# Patient Record
Sex: MALE | Race: WHITE | NOT HISPANIC OR LATINO | URBAN - NONMETROPOLITAN AREA
[De-identification: names, ages, dates, MRNs, and addresses within clinical notes are randomized per-mention and may not be internally consistent; named-entity substitution may affect disease eponyms.]

---

## 2017-08-09 NOTE — PATIENT DISCUSSION
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 - Plan : Monitor for changes. Advised patient to call our office with decreased vision or increased distortion. Patient advised to check [employer] regularly (once weekly or more) and use nutraceuticals such as AREDS 2 eye vitamins. Wear sunglasses when outdoors and eat green, leafy vegetables to maintain ocular health. RV 4-6 Months for Follow Up and Macular OCT. Sooner with problems or changes in vision.

## 2017-08-09 NOTE — PATIENT DISCUSSION
(C21.2587) Nonexudative age-related macular degeneration left eye inter - Assesment : Examination revealed AMD Dry OS. - Plan : Monitor for changes. Advised patient to call our office with decreased vision or increased distortion. Patient advised to check [employer] regularly (once weekly or more) and use nutraceuticals such as AREDS 2 eye vitamins. Wear sunglasses when outdoors and eat green, leafy vegetables to maintain ocular health. Consider referring to Dr. Domenica Marie for another eval if worsening at next appt. RV 4-6 Months for Follow Up and Macular OCT.

## 2017-08-09 NOTE — PATIENT DISCUSSION
(H35.373) Puckering of macula, bilateral - Assesment : Examination revealed ERM. Stable - Plan : Monitor for changes. Advised patient to call our office with decreased vision or increased symptoms.

## 2017-08-09 NOTE — PATIENT DISCUSSION
(H25.013) Cortical age-related cataract, bilateral - Assesment : Examination revealed Cortical Senile Cataract. - Plan : Monitor for changes. Advised patient to call our office with decreased vision or increased symptoms. See Plan #1.

## 2017-08-11 PROBLEM — H40.013: Noted: 2017-08-11

## 2017-08-11 PROBLEM — H52.4: Noted: 2017-08-11

## 2017-08-11 PROBLEM — H25.13: Noted: 2017-08-11

## 2018-01-12 NOTE — PATIENT DISCUSSION
(H25.013) Cortical age-related cataract, bilateral - Assesment : Examination revealed Cortical Senile Cataract OU. Mild-moderate symptoms. - Plan : Monitor for changes. Advised patient to call our office with decreased vision or increased symptoms.

## 2018-01-12 NOTE — PATIENT DISCUSSION
(H35.372) Puckering of macula, left eye - Assesment : Examination revealed ERM OS. Stable. - Plan : Monitor. Advised patient to call our office with decreased vision or increased symptoms.

## 2018-01-12 NOTE — PATIENT DISCUSSION
(M51.1193) Nonexudative age-related macular degeneration left eye early - Assesment : Examination revealed early dry ARMD OS. Macular OCT shows stable. - Plan : Monitor for changes. Advised patient to call our office with decreased vision or increased distortion. Patient advised to check [employer] regularly (once weekly or more) and use nutraceuticals such as AREDS 2 eye vitamins. Wear sunglasses when outdoors and eat green, leafy vegetables to maintain ocular health. RV 9-10 Months for Complete Exam and Macular OCT. Sooner with problems or changes.

## 2018-10-05 NOTE — PATIENT DISCUSSION
(A61.8899) Nonexudative age-related macular degeneration left eye early - Assesment : Examination revealed early dry ARMD OS. OCT mac today suggests shows stable dry AMD OU. - Plan : Monitor for changes. Advised patient to call our office with decreased vision or increased distortion. Patient advised to check [employer] regularly (once weekly or more) and use nutraceuticals such as AREDS 2 eye vitamins. Wear sunglasses when outdoors and eat green, leafy vegetables to maintain ocular health. RTC 6 month follow up/OCT mac, sooner with problems or changes.

## 2018-10-05 NOTE — PATIENT DISCUSSION
(H25.013) Cortical age-related cataract, bilateral - Assesment : Examination revealed Cortical Senile Cataract OU. Mild-moderate symptoms. Patient is currently asymptomatic and functioning well. - Plan : Monitor for changes. Advised patient to call our office with decreased vision or increased symptoms.

## 2018-10-05 NOTE — PATIENT DISCUSSION
(H42.610) Vitreous degeneration, right eye - Assesment : Examination revealed a posterior vitreous detachment. - Plan : Handouts given on posterior vitreous detachment and risk factors discussed for retinal detachment development. Advised to call immediately with any changes.

## 2019-04-16 NOTE — PATIENT DISCUSSION
(J16.0531) Nonexudative age-related macular degeneration left eye early - Assesment : Examination revealed early dry ARMD OS. OCT mac today suggests OD- dry AMD, ERM unchanged,OS- Hypo pigmented change, ERM- unchanged - Plan : Monitor for changes. Advised patient to call our office with decreased vision or increased distortion. Patient advised to continue  checking [employer] regularly (once weekly or more) and use nutraceuticals such as AREDS 2 eye vitamins. Wear sunglasses when outdoors and eat green, leafy vegetables to maintain ocular health. RTC 1 year/EXAM/OCT mac, sooner with problems or changes.

## 2019-04-16 NOTE — PATIENT DISCUSSION
(A72.076) Vitreous degeneration, bilateral - Assesment : Examination revealed a posterior vitreous detachment. - Plan : Monitor for changes. Advised patient to call our office with decreased vision or an increase in flashes and/or floaters.

## 2020-08-17 ENCOUNTER — IMPORTED ENCOUNTER (OUTPATIENT)
Dept: URBAN - NONMETROPOLITAN AREA CLINIC 1 | Facility: CLINIC | Age: 60
End: 2020-08-17

## 2020-08-17 PROCEDURE — 92004 COMPRE OPH EXAM NEW PT 1/>: CPT

## 2020-08-17 PROCEDURE — 92015 DETERMINE REFRACTIVE STATE: CPT

## 2020-08-17 NOTE — PATIENT DISCUSSION
Presbyopia OUDiscussed refractive status in detail with patient. New glasses Rx given today. Continue to monitor. Glaucoma Suspect Discussed diagnosis in detail with patient. No family history of disease. IOP at 19 OU. Cup to disc 0.6 OD and 0.55 OS. Continue to monitorRTC in 6 months with VF 24-2 OCT and [place] OU Discussed diagnosis with patient. Reviewed symptoms related to cataract progression. Discussed various treatment options with patient. No treatment required at this time. Continue to monitor.; 's Notes: MR  8/17/20DFE   DEFERREDOPTOS 8/17/20VFOCTGONIOPACH

## 2020-09-17 NOTE — PATIENT DISCUSSION
(K03.591) Vitreous degeneration, bilateral - Assesment : Examination revealed a posterior vitreous detachment. - Plan : Monitor for changes. Advised patient to call our office with decreased vision or an increase in flashes and/or floaters.

## 2020-09-17 NOTE — PATIENT DISCUSSION
The cataracts are mild and have minimal impact on vision at this time. slipping, stumbling. tripping

## 2021-12-29 NOTE — PATIENT DISCUSSION
Advised patient to call our office with decreased vision or increased distortion. Patient advised to continue checking [employer] regularly (once weekly or more) and use nutraceuticals such as AREDS 2 eye vitamins. Wear sunglasses when outdoors and eat green, leafy vegetables to maintain ocular health. RTC 1 year/EXAM/OCT mac, sooner with problems or changes.

## 2021-12-29 NOTE — PATIENT DISCUSSION
(C80.8140) Nonexudative age-related macular degeneration left eye early - Assesment : Examination revealed early dry ARMD OS. OCT mac today suggests OD- dry AMD, ERM unchanged,OS- Hypo pigmented change, ERM- unchanged - Plan : Monitor for changes. Advised patient to call our office with decreased vision or increased distortion. Patient advised to continue  checking [employer] regularly (once weekly or more) and use nutraceuticals such as AREDS 2 eye vitamins. Wear sunglasses when outdoors and eat green, leafy vegetables to maintain ocular health. RTC 1 year/EXAM/OCT mac, sooner with problems or changes.

## 2022-04-09 ASSESSMENT — VISUAL ACUITY
OS_SC: 20/25-2
OD_SC: 20/20-2

## 2022-04-09 ASSESSMENT — TONOMETRY
OS_IOP_MMHG: 18
OD_IOP_MMHG: 18

## 2023-10-04 ENCOUNTER — NEW PATIENT (OUTPATIENT)
Dept: URBAN - NONMETROPOLITAN AREA CLINIC 1 | Facility: CLINIC | Age: 63
End: 2023-10-04

## 2023-10-04 DIAGNOSIS — H52.4: ICD-10-CM

## 2023-10-04 PROCEDURE — 92015 DETERMINE REFRACTIVE STATE: CPT

## 2023-10-04 PROCEDURE — 92004 COMPRE OPH EXAM NEW PT 1/>: CPT

## 2023-10-04 PROCEDURE — 92499OP2 OPTOMAP RETINAL SCREENING BOTH EYES

## 2023-10-04 ASSESSMENT — TONOMETRY
OD_IOP_MMHG: 26
OS_IOP_MMHG: 20
OD_IOP_MMHG: 18
OS_IOP_MMHG: 29

## 2023-10-04 ASSESSMENT — VISUAL ACUITY
OD_CC: 20/30
OU_CC: 20/25
OS_CC: 20/30

## 2023-12-06 ENCOUNTER — CONSULTATION/EVALUATION (OUTPATIENT)
Dept: URBAN - NONMETROPOLITAN AREA CLINIC 1 | Facility: CLINIC | Age: 63
End: 2023-12-06

## 2023-12-06 DIAGNOSIS — H25.813: ICD-10-CM

## 2023-12-06 PROCEDURE — 92025 CPTRIZED CORNEAL TOPOGRAPHY: CPT

## 2023-12-06 PROCEDURE — 99214 OFFICE O/P EST MOD 30 MIN: CPT

## 2023-12-06 PROCEDURE — 92134 CPTRZ OPH DX IMG PST SGM RTA: CPT

## 2023-12-06 PROCEDURE — 92136 OPHTHALMIC BIOMETRY: CPT

## 2023-12-06 ASSESSMENT — VISUAL ACUITY
OS_CC: 20/30
OS_BAT: 20/50
OD_BAT: 20/50
OD_CC: 20/40
OD_PAM: 20/25
OS_SC: 20/100
OS_CC: 20/29
OD_CC: 20/29-1
OD_SC: 20/250
OS_AM: 20/30

## 2023-12-06 ASSESSMENT — TONOMETRY
OS_IOP_MMHG: 15
OD_IOP_MMHG: 15

## 2024-01-16 ENCOUNTER — PRE-OP/H&P (OUTPATIENT)
Dept: URBAN - NONMETROPOLITAN AREA CLINIC 1 | Facility: CLINIC | Age: 64
End: 2024-01-16

## 2024-01-16 VITALS
HEIGHT: 70 IN | WEIGHT: 197 LBS | BODY MASS INDEX: 28.2 KG/M2 | SYSTOLIC BLOOD PRESSURE: 138 MMHG | DIASTOLIC BLOOD PRESSURE: 74 MMHG | HEART RATE: 79 BPM

## 2024-01-16 DIAGNOSIS — I10: ICD-10-CM

## 2024-01-16 DIAGNOSIS — I25.2: ICD-10-CM

## 2024-01-16 DIAGNOSIS — K50.918: ICD-10-CM

## 2024-01-16 DIAGNOSIS — E11.9: ICD-10-CM

## 2024-01-16 DIAGNOSIS — I50.9: ICD-10-CM

## 2024-01-16 DIAGNOSIS — E78.2: ICD-10-CM

## 2024-01-16 DIAGNOSIS — Z01.818: ICD-10-CM

## 2024-01-16 PROCEDURE — 99213 OFFICE O/P EST LOW 20 MIN: CPT

## 2024-02-13 ENCOUNTER — PRE-OP/H&P (OUTPATIENT)
Dept: URBAN - NONMETROPOLITAN AREA CLINIC 1 | Facility: CLINIC | Age: 64
End: 2024-02-13

## 2024-02-13 VITALS
HEIGHT: 70 IN | SYSTOLIC BLOOD PRESSURE: 130 MMHG | BODY MASS INDEX: 28.2 KG/M2 | HEART RATE: 86 BPM | WEIGHT: 197 LBS | DIASTOLIC BLOOD PRESSURE: 86 MMHG

## 2024-02-13 DIAGNOSIS — E78.2: ICD-10-CM

## 2024-02-13 DIAGNOSIS — E11.9: ICD-10-CM

## 2024-02-13 DIAGNOSIS — I50.9: ICD-10-CM

## 2024-02-13 DIAGNOSIS — Z01.818: ICD-10-CM

## 2024-02-13 DIAGNOSIS — K50.918: ICD-10-CM

## 2024-02-13 DIAGNOSIS — I25.2: ICD-10-CM

## 2024-02-13 DIAGNOSIS — I10: ICD-10-CM

## 2024-02-13 PROCEDURE — 99213 OFFICE O/P EST LOW 20 MIN: CPT

## 2024-02-20 ENCOUNTER — SURGERY/PROCEDURE (OUTPATIENT)
Dept: URBAN - NONMETROPOLITAN AREA CLINIC 2 | Facility: CLINIC | Age: 64
End: 2024-02-20

## 2024-02-20 DIAGNOSIS — H25.812: ICD-10-CM

## 2024-02-20 PROCEDURE — 66984 XCAPSL CTRC RMVL W/O ECP: CPT

## 2024-02-21 ENCOUNTER — POST OP/EVAL OF SECOND EYE (OUTPATIENT)
Dept: URBAN - NONMETROPOLITAN AREA CLINIC 1 | Facility: CLINIC | Age: 64
End: 2024-02-21

## 2024-02-21 DIAGNOSIS — H25.811: ICD-10-CM

## 2024-02-21 DIAGNOSIS — Z98.42: ICD-10-CM

## 2024-02-21 PROCEDURE — 99213 OFFICE O/P EST LOW 20 MIN: CPT

## 2024-02-21 ASSESSMENT — VISUAL ACUITY
OD_CC: 20/29-1
OD_BAT: 20/50
OS_PH: 20/40
OD_SC: 20/250
OD_PAM: 20/25
OS_SC: 20/150
OD_CC: 20/40

## 2024-02-21 ASSESSMENT — TONOMETRY
OD_IOP_MMHG: 16
OS_IOP_MMHG: 17

## 2024-04-30 ENCOUNTER — CONSULTATION/EVALUATION (OUTPATIENT)
Dept: URBAN - NONMETROPOLITAN AREA CLINIC 1 | Facility: CLINIC | Age: 64
End: 2024-04-30

## 2024-04-30 DIAGNOSIS — Z98.42: ICD-10-CM

## 2024-04-30 PROCEDURE — 99024 POSTOP FOLLOW-UP VISIT: CPT

## 2024-04-30 ASSESSMENT — VISUAL ACUITY
OD_PAM: 20/30
OD_CC: 20/40
OD_SC: 20/250
OS_PH: 20/30
OD_PH: 20/40
OS_SC: 20/100
OD_CC: 20/30-1
OD_BAT: 20/50-2

## 2024-04-30 ASSESSMENT — TONOMETRY: OD_IOP_MMHG: 17

## 2024-05-07 ENCOUNTER — PRE-OP/H&P (OUTPATIENT)
Dept: URBAN - NONMETROPOLITAN AREA CLINIC 1 | Facility: CLINIC | Age: 64
End: 2024-05-07

## 2024-05-07 VITALS
SYSTOLIC BLOOD PRESSURE: 128 MMHG | HEIGHT: 70 IN | DIASTOLIC BLOOD PRESSURE: 78 MMHG | WEIGHT: 197 LBS | HEART RATE: 77 BPM | BODY MASS INDEX: 28.2 KG/M2

## 2024-05-07 DIAGNOSIS — K50.918: ICD-10-CM

## 2024-05-07 DIAGNOSIS — E11.9: ICD-10-CM

## 2024-05-07 DIAGNOSIS — I50.9: ICD-10-CM

## 2024-05-07 DIAGNOSIS — I25.2: ICD-10-CM

## 2024-05-07 DIAGNOSIS — Z01.818: ICD-10-CM

## 2024-05-07 DIAGNOSIS — E78.2: ICD-10-CM

## 2024-05-07 DIAGNOSIS — I10: ICD-10-CM

## 2024-05-07 PROCEDURE — 99213 OFFICE O/P EST LOW 20 MIN: CPT

## 2024-05-14 ENCOUNTER — SURGERY/PROCEDURE (OUTPATIENT)
Facility: LOCATION | Age: 64
End: 2024-05-14

## 2024-05-14 DIAGNOSIS — H25.811: ICD-10-CM

## 2024-05-14 PROCEDURE — 66984 XCAPSL CTRC RMVL W/O ECP: CPT | Mod: 79,RT

## 2024-05-16 ENCOUNTER — POST-OP (OUTPATIENT)
Dept: URBAN - NONMETROPOLITAN AREA CLINIC 1 | Facility: CLINIC | Age: 64
End: 2024-05-16

## 2024-05-16 DIAGNOSIS — Z98.42: ICD-10-CM

## 2024-05-16 DIAGNOSIS — Z98.41: ICD-10-CM

## 2024-05-16 PROCEDURE — 99024 POSTOP FOLLOW-UP VISIT: CPT

## 2024-05-16 ASSESSMENT — VISUAL ACUITY
OD_SC: 20/200
OD_PH: 20/63
OS_SC: 20/63
OS_PH: 20/40

## 2024-05-16 ASSESSMENT — TONOMETRY: OS_IOP_MMHG: 16

## 2024-05-22 ENCOUNTER — POST-OP (OUTPATIENT)
Dept: URBAN - NONMETROPOLITAN AREA CLINIC 1 | Facility: CLINIC | Age: 64
End: 2024-05-22

## 2024-05-22 DIAGNOSIS — Z98.42: ICD-10-CM

## 2024-05-22 DIAGNOSIS — Z98.41: ICD-10-CM

## 2024-05-22 PROCEDURE — 99024 POSTOP FOLLOW-UP VISIT: CPT

## 2024-05-22 ASSESSMENT — VISUAL ACUITY
OU_SC: 20/70
OS_PH: 20/40
OS_SC: 20/100-
OD_PH: 20/50-
OD_SC: 20/150

## 2024-05-22 ASSESSMENT — TONOMETRY
OS_IOP_MMHG: 22
OD_IOP_MMHG: 21

## 2024-06-13 ENCOUNTER — POST-OP (OUTPATIENT)
Dept: URBAN - NONMETROPOLITAN AREA CLINIC 1 | Facility: CLINIC | Age: 64
End: 2024-06-13

## 2024-06-13 DIAGNOSIS — H52.4: ICD-10-CM

## 2024-06-13 DIAGNOSIS — Z98.41: ICD-10-CM

## 2024-06-13 DIAGNOSIS — Z98.42: ICD-10-CM

## 2024-06-13 PROCEDURE — 92015 DETERMINE REFRACTIVE STATE: CPT

## 2024-06-13 PROCEDURE — 99024 POSTOP FOLLOW-UP VISIT: CPT

## 2024-06-13 ASSESSMENT — VISUAL ACUITY
OS_SC: 20/100+2
OD_SC: 20/200
OD_PH: 20/40
OS_PH: 20/32+1

## 2024-06-13 ASSESSMENT — TONOMETRY
OS_IOP_MMHG: 18
OD_IOP_MMHG: 18

## 2024-09-23 ENCOUNTER — FOLLOW UP (OUTPATIENT)
Dept: URBAN - NONMETROPOLITAN AREA CLINIC 1 | Facility: CLINIC | Age: 64
End: 2024-09-23

## 2024-09-23 DIAGNOSIS — Z96.1: ICD-10-CM

## 2024-09-23 DIAGNOSIS — H26.493: ICD-10-CM

## 2024-09-23 DIAGNOSIS — H40.013: ICD-10-CM

## 2024-09-23 DIAGNOSIS — E11.9: ICD-10-CM

## 2024-09-23 PROCEDURE — 99213 OFFICE O/P EST LOW 20 MIN: CPT

## 2024-09-23 PROCEDURE — 92250 FUNDUS PHOTOGRAPHY W/I&R: CPT

## 2024-10-08 NOTE — PATIENT DISCUSSION
(H95.1635) Nonexudative age-related macular degeneration left eye early - Assesment : Examination revealed early dry ARMD OS. OCT mac today suggests OD- dry AMD, ERM unchanged,OS- Hypo pigmented change, ERM- unchanged - Plan : Monitor for changes. Advised patient to call our office with decreased vision or increased distortion. Patient advised to continue  checking [employer] regularly (once weekly or more) and use nutraceuticals such as AREDS 2 eye vitamins. Wear sunglasses when outdoors and eat green, leafy vegetables to maintain ocular health. RTC 1 year/EXAM/OCT mac, sooner with problems or changes. positive S1/positive S2

## 2025-04-04 ENCOUNTER — FOLLOW UP (OUTPATIENT)
Age: 65
End: 2025-04-04

## 2025-04-04 DIAGNOSIS — H40.013: ICD-10-CM

## 2025-04-04 PROCEDURE — 92133 CPTRZD OPH DX IMG PST SGM ON: CPT

## 2025-04-04 PROCEDURE — 92083 EXTENDED VISUAL FIELD XM: CPT

## 2025-04-04 PROCEDURE — 99213 OFFICE O/P EST LOW 20 MIN: CPT
